# Patient Record
Sex: FEMALE | Race: BLACK OR AFRICAN AMERICAN | NOT HISPANIC OR LATINO | Employment: STUDENT | URBAN - METROPOLITAN AREA
[De-identification: names, ages, dates, MRNs, and addresses within clinical notes are randomized per-mention and may not be internally consistent; named-entity substitution may affect disease eponyms.]

---

## 2023-12-21 PROBLEM — R79.83 HOMOCYSTEINEMIA: Status: ACTIVE | Noted: 2023-12-21

## 2023-12-21 PROBLEM — E28.2 PCOS (POLYCYSTIC OVARIAN SYNDROME): Status: ACTIVE | Noted: 2023-12-21

## 2024-10-02 ENCOUNTER — APPOINTMENT (OUTPATIENT)
Dept: URGENT CARE | Age: 24
End: 2024-10-02
Payer: COMMERCIAL

## 2024-10-22 ENCOUNTER — OFFICE VISIT (OUTPATIENT)
Dept: URGENT CARE | Age: 24
End: 2024-10-22
Payer: COMMERCIAL

## 2024-10-22 VITALS
OXYGEN SATURATION: 98 % | SYSTOLIC BLOOD PRESSURE: 130 MMHG | TEMPERATURE: 97.9 F | DIASTOLIC BLOOD PRESSURE: 90 MMHG | RESPIRATION RATE: 18 BRPM | HEART RATE: 71 BPM

## 2024-10-22 DIAGNOSIS — Z20.822 COVID-19 RULED OUT: ICD-10-CM

## 2024-10-22 DIAGNOSIS — J02.9 SORE THROAT: ICD-10-CM

## 2024-10-22 DIAGNOSIS — R19.7 DIARRHEA, UNSPECIFIED TYPE: ICD-10-CM

## 2024-10-22 DIAGNOSIS — B08.5 PHARYNGITIS DUE TO COXSACKIE VIRUS: Primary | ICD-10-CM

## 2024-10-22 LAB
POC RAPID STREP: NEGATIVE
POC SARS-COV-2 AG BINAX: NORMAL

## 2024-10-22 PROCEDURE — 87880 STREP A ASSAY W/OPTIC: CPT | Performed by: EMERGENCY MEDICINE

## 2024-10-22 PROCEDURE — 87811 SARS-COV-2 COVID19 W/OPTIC: CPT | Performed by: EMERGENCY MEDICINE

## 2024-10-22 PROCEDURE — 99213 OFFICE O/P EST LOW 20 MIN: CPT | Performed by: EMERGENCY MEDICINE

## 2024-10-22 ASSESSMENT — ENCOUNTER SYMPTOMS
DIARRHEA: 1
SORE THROAT: 1

## 2024-10-22 NOTE — PROGRESS NOTES
Subjective   Patient ID: Stephanie Dumont is a 24 y.o. female. They present today with a chief complaint of Diarrhea and Sore Throat (Small white spots on tonsils. Patient says 3 weeks ago she had a televisit and had similar symptoms. She was prescribed antibiotics. And yesterday diarrhea and sore/ scratchy throat began again. ).    History of Present Illness    Diarrhea  Sore Throat   Associated symptoms include diarrhea.    a 24-year-old -American female presents this morning complaining of recurrent sore throat after she was treated for same 3 weeks ago via telehealth.  She denies any fever or chills.  She denies any nausea or vomiting.  States this morning she noted her stool to be somewhat softer than normal.  She denies any  symptoms.  She denies any headache.  She denies any cough.    Past Medical History  Allergies as of 10/22/2024    (No Known Allergies)       (Not in a hospital admission)       Past Medical History:   Diagnosis Date    Acute nasopharyngitis (common cold) 2016    Common cold    Acute pharyngitis, unspecified 2016    Sore throat    Acute pharyngitis, unspecified 2016    Sore throat    Acute pharyngitis, unspecified 2016    Acute viral pharyngitis    Cough, unspecified 2016    Cough    Dehydration 2014    Dehydration, mild    Dysmenorrhea, unspecified 2015    Menstrual cramps    Hypertrophy of nasal turbinates 2016    Hypertrophy of nasal turbinates     jaundice, unspecified      hyperbilirubinemia    Other conditions influencing health status     Blocked tear duct    Other symptoms and signs involving general sensations and perceptions 2016    Facial pressure    Personal history of diseases of the skin and subcutaneous tissue     History of atopic dermatitis    Personal history of other diseases of the digestive system 2014    History of constipation    Personal history of other diseases of the digestive  system 06/09/2014    History of gastroenteritis    Personal history of other diseases of the respiratory system 03/24/2015    History of viral pharyngitis    Personal history of other specified conditions     History of wheezing    Personal history of other specified conditions 07/16/2014    History of abdominal pain    Personal history of other specified conditions 07/16/2014    History of diarrhea    Personal history of other specified conditions 06/18/2014    History of vomiting    Pruritus, unspecified 08/09/2015    Itching    Vitamin D deficiency, unspecified     Vitamin D deficiency       History reviewed. No pertinent surgical history.         Review of Systems  Review of Systems   HENT:  Positive for sore throat.    Gastrointestinal:  Positive for diarrhea.   All other systems reviewed and are negative.                                 Objective    Vitals:    10/22/24 0849   BP: 130/90   Pulse: 71   Resp: 18   Temp: 36.6 °C (97.9 °F)   SpO2: 98%     Patient's last menstrual period was 10/06/2024 (exact date).    Physical Exam  Patient is awake alert oriented x 3 in no acute distress.  Vital signs are stable.  She is afebrile.  Throat nonerythematous.  Tonsils not enlarged.  No anterior cervical adenopathy.  Neck supple.  Nontoxic-appearing.  Well-hydrated.  Procedures    Point of Care Test & Imaging Results from this visit  Results for orders placed or performed in visit on 10/22/24   POCT Covid-19 Rapid Antigen   Result Value Ref Range    POC BILLIE-COV-2 AG  Presumptive negative test for SARS-CoV-2 (no antigen detected)     Presumptive negative test for SARS-CoV-2 (no antigen detected)   POCT rapid strep A manually resulted   Result Value Ref Range    POC Rapid Strep Negative Negative      No results found.    Diagnostic study results (if any) were reviewed by Robert Patel DO.    Assessment/Plan   Allergies, medications, history, and pertinent labs/EKGs/Imaging reviewed by Robert Patel DO.      Medical Decision Making  Rule out strep.  Rule out COVID.    Orders and Diagnoses  Diagnoses and all orders for this visit:  COVID-19 ruled out  -     POCT Covid-19 Rapid Antigen  Sore throat  -     POCT Covid-19 Rapid Antigen  -     POCT rapid strep A manually resulted  Diarrhea, unspecified type  -     POCT Covid-19 Rapid Antigen      Medical Admin Record      Patient disposition: Home    Electronically signed by Robert Patel DO  8:58 AM